# Patient Record
(demographics unavailable — no encounter records)

---

## 2025-05-19 NOTE — ASSESSMENT
[FreeTextEntry1] : 72 yo male with PMH of Enlarged AAA s/p repair on 8/2024 was initially evaluated for hematuria and abdominal pain and was found to have prostatomegaly and elevated PSA. He continues to experience intermittent gross hematuria several times weekly.   Gross hematuria likely secondary to prostatomegaly.  Ordered Ur micro and UCx Continue finasteride 5 mg daily and tamsulosin 0.4 mg nightly; refills sent to pharmacy.  Ordered PSA for prostate cancer screening.   Tasked secretarial staff at [facility] office to schedule patient for cystoscopy.